# Patient Record
Sex: MALE | Race: WHITE | NOT HISPANIC OR LATINO | URBAN - METROPOLITAN AREA
[De-identification: names, ages, dates, MRNs, and addresses within clinical notes are randomized per-mention and may not be internally consistent; named-entity substitution may affect disease eponyms.]

---

## 2024-11-08 ENCOUNTER — OFFICE VISIT (OUTPATIENT)
Dept: URBAN - METROPOLITAN AREA CLINIC 88 | Facility: CLINIC | Age: 54
End: 2024-11-08
Payer: OTHER GOVERNMENT

## 2024-11-08 ENCOUNTER — LAB OUTSIDE AN ENCOUNTER (OUTPATIENT)
Dept: URBAN - METROPOLITAN AREA CLINIC 88 | Facility: CLINIC | Age: 54
End: 2024-11-08

## 2024-11-08 ENCOUNTER — DASHBOARD ENCOUNTERS (OUTPATIENT)
Age: 54
End: 2024-11-08

## 2024-11-08 VITALS
OXYGEN SATURATION: 99 % | HEART RATE: 75 BPM | BODY MASS INDEX: 27.11 KG/M2 | DIASTOLIC BLOOD PRESSURE: 102 MMHG | SYSTOLIC BLOOD PRESSURE: 144 MMHG | HEIGHT: 70 IN | WEIGHT: 189.4 LBS | TEMPERATURE: 97.2 F

## 2024-11-08 DIAGNOSIS — K92.1 HEMATOCHEZIA: ICD-10-CM

## 2024-11-08 DIAGNOSIS — K62.9 ANAL LESION: ICD-10-CM

## 2024-11-08 DIAGNOSIS — R19.5 MUCUS IN STOOL: ICD-10-CM

## 2024-11-08 PROCEDURE — 99203 OFFICE O/P NEW LOW 30 MIN: CPT | Performed by: NURSE PRACTITIONER

## 2024-11-08 RX ORDER — IBUPROFEN 800 MG/1
1 TABLET WITH FOOD OR MILK AS NEEDED TABLET, FILM COATED ORAL
Status: ACTIVE | COMMUNITY

## 2024-11-08 NOTE — HPI-TODAY'S VISIT:
54 y.o. referred by Dr. Shayy Quiñones for evaluation of hematochezia and mucus in stool that started over 3 months ago.  (Later admits that he has been experiencing hematochezia for over 6 months intermittently.)   Observes BRRB on tissue after wiping.  Denies rectal pain, change in bowel consistency or frequency of defecation, unexplained weight loss or abdominal pain.  Mucus is present with every BM.  Defecation occurs at least 2x/day with complete sense of evacuation of stool.  Has been using OTC hemorrhoid creams.  Takes ibuprofen as needed for right shoulder pain.   Denies family hx of IBD, colon cancer or other GI malignancies.  He has not had prior colonoscopy procedure.

## 2024-11-08 NOTE — PHYSICAL EXAM RECTAL:
Chaperone present, anal lesion with sot, friable, cauliflower like cluster to anal canal, normal tone, no internal lesions.

## 2024-11-15 ENCOUNTER — OFFICE VISIT (OUTPATIENT)
Dept: URBAN - METROPOLITAN AREA SURGERY CENTER 24 | Facility: SURGERY CENTER | Age: 54
End: 2024-11-15

## 2024-11-22 ENCOUNTER — OFFICE VISIT (OUTPATIENT)
Dept: URBAN - METROPOLITAN AREA SURGERY CENTER 24 | Facility: SURGERY CENTER | Age: 54
End: 2024-11-22

## 2025-01-17 ENCOUNTER — CLAIMS CREATED FROM THE CLAIM WINDOW (OUTPATIENT)
Dept: URBAN - METROPOLITAN AREA SURGERY CENTER 24 | Facility: SURGERY CENTER | Age: 55
End: 2025-01-17
Payer: OTHER GOVERNMENT

## 2025-01-17 ENCOUNTER — CLAIMS CREATED FROM THE CLAIM WINDOW (OUTPATIENT)
Dept: URBAN - METROPOLITAN AREA CLINIC 4 | Facility: CLINIC | Age: 55
End: 2025-01-17
Payer: OTHER GOVERNMENT

## 2025-01-17 DIAGNOSIS — D12.2 BENIGN NEOPLASM OF ASCENDING COLON: ICD-10-CM

## 2025-01-17 DIAGNOSIS — D12.5 ADENOMA OF SIGMOID COLON: ICD-10-CM

## 2025-01-17 DIAGNOSIS — K92.1 HEMATOCHEZIA: ICD-10-CM

## 2025-01-17 DIAGNOSIS — K62.5 ANAL BLEEDING: ICD-10-CM

## 2025-01-17 DIAGNOSIS — K57.30 DIVERTICULOSIS OF COLON: ICD-10-CM

## 2025-01-17 DIAGNOSIS — D12.5 BENIGN NEOPLASM OF SIGMOID COLON: ICD-10-CM

## 2025-01-17 DIAGNOSIS — D12.2 ADENOMA OF ASCENDING COLON: ICD-10-CM

## 2025-01-17 PROCEDURE — 88305 TISSUE EXAM BY PATHOLOGIST: CPT | Performed by: PATHOLOGY

## 2025-01-17 PROCEDURE — 45385 COLONOSCOPY W/LESION REMOVAL: CPT | Performed by: INTERNAL MEDICINE

## 2025-01-17 PROCEDURE — 00811 ANES LWR INTST NDSC NOS: CPT | Performed by: NURSE ANESTHETIST, CERTIFIED REGISTERED
